# Patient Record
Sex: MALE | NOT HISPANIC OR LATINO | URBAN - METROPOLITAN AREA
[De-identification: names, ages, dates, MRNs, and addresses within clinical notes are randomized per-mention and may not be internally consistent; named-entity substitution may affect disease eponyms.]

---

## 2023-03-10 DIAGNOSIS — E03.9 HYPOTHYROIDISM, UNSPECIFIED: ICD-10-CM

## 2023-03-13 NOTE — TELEPHONE ENCOUNTER
Patient is due for apt prior to prescription approval.    Please contact the patient and assist with scheduling.    Once scheduled please route back to [facility] so we can review and forward to DO

## 2023-04-07 ENCOUNTER — OFFICE VISIT (OUTPATIENT)
Dept: PRIMARY CARE | Facility: CLINIC | Age: 23
End: 2023-04-07
Payer: COMMERCIAL

## 2023-04-07 ENCOUNTER — LAB (OUTPATIENT)
Dept: LAB | Facility: LAB | Age: 23
End: 2023-04-07
Payer: COMMERCIAL

## 2023-04-07 VITALS
WEIGHT: 130 LBS | RESPIRATION RATE: 20 BRPM | BODY MASS INDEX: 18.61 KG/M2 | HEIGHT: 70 IN | OXYGEN SATURATION: 98 % | SYSTOLIC BLOOD PRESSURE: 109 MMHG | HEART RATE: 70 BPM | TEMPERATURE: 98.2 F | DIASTOLIC BLOOD PRESSURE: 67 MMHG

## 2023-04-07 DIAGNOSIS — E03.9 HYPOTHYROIDISM, UNSPECIFIED TYPE: ICD-10-CM

## 2023-04-07 DIAGNOSIS — E53.8 VITAMIN B12 DEFICIENCY: ICD-10-CM

## 2023-04-07 DIAGNOSIS — E55.9 VITAMIN D DEFICIENCY: ICD-10-CM

## 2023-04-07 DIAGNOSIS — K21.9 GASTROESOPHAGEAL REFLUX DISEASE, UNSPECIFIED WHETHER ESOPHAGITIS PRESENT: Primary | ICD-10-CM

## 2023-04-07 PROBLEM — R10.13 ABDOMINAL PAIN, EPIGASTRIC: Status: ACTIVE | Noted: 2023-04-07

## 2023-04-07 PROBLEM — J06.9 ACUTE URI: Status: ACTIVE | Noted: 2023-04-07

## 2023-04-07 PROBLEM — B34.9 VIRAL ILLNESS: Status: ACTIVE | Noted: 2023-04-07

## 2023-04-07 PROBLEM — J02.9 ACUTE PHARYNGITIS: Status: ACTIVE | Noted: 2023-04-07

## 2023-04-07 PROBLEM — J02.9 SORE THROAT: Status: ACTIVE | Noted: 2023-04-07

## 2023-04-07 PROBLEM — R00.2 HEART PALPITATIONS: Status: ACTIVE | Noted: 2023-04-07

## 2023-04-07 PROBLEM — R63.4 WEIGHT LOSS: Status: ACTIVE | Noted: 2023-04-07

## 2023-04-07 LAB — THYROTROPIN (MIU/L) IN SER/PLAS BY DETECTION LIMIT <= 0.05 MIU/L: 1.8 MIU/L (ref 0.44–3.98)

## 2023-04-07 PROCEDURE — 84443 ASSAY THYROID STIM HORMONE: CPT

## 2023-04-07 PROCEDURE — 99213 OFFICE O/P EST LOW 20 MIN: CPT | Performed by: FAMILY MEDICINE

## 2023-04-07 PROCEDURE — 82306 VITAMIN D 25 HYDROXY: CPT

## 2023-04-07 PROCEDURE — 82607 VITAMIN B-12: CPT

## 2023-04-07 PROCEDURE — 36415 COLL VENOUS BLD VENIPUNCTURE: CPT

## 2023-04-07 RX ORDER — OMEPRAZOLE 40 MG/1
1 CAPSULE, DELAYED RELEASE ORAL DAILY
COMMUNITY
Start: 2020-06-16 | End: 2023-04-07 | Stop reason: SDUPTHER

## 2023-04-07 RX ORDER — OMEPRAZOLE 40 MG/1
40 CAPSULE, DELAYED RELEASE ORAL DAILY
Qty: 90 CAPSULE | Refills: 1 | Status: SHIPPED | OUTPATIENT
Start: 2023-04-07 | End: 2023-10-04

## 2023-04-07 RX ORDER — LEVOTHYROXINE SODIUM 25 UG/1
25 TABLET ORAL DAILY
COMMUNITY
End: 2023-04-07 | Stop reason: SDUPTHER

## 2023-04-07 RX ORDER — LEVOTHYROXINE SODIUM 25 UG/1
25 TABLET ORAL DAILY
Qty: 90 TABLET | Refills: 1 | Status: SHIPPED | OUTPATIENT
Start: 2023-04-07 | End: 2023-10-04

## 2023-04-07 NOTE — PROGRESS NOTES
Subjective   Patient ID: Cy Rosario is a 22 y.o. male who presents for Med Refill (No complaints).    HPI   Patient comes in today for checkup and refill of his medications.  He is currently without complaints.  He did run out of his omeprazole medication and did have some heartburn.  He has been taking the thyroid medicine as directed.        7/14/2022  Patient comes in today for annual physical exam. 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 He is taking his medicines without any side effects and admits that he is not always consistent in taking them.      3/30/2021  Patient presents today for checkup and refill of meds. He currently is without complaints other than occasional chest pains. He states he is no longer having the heart palpitations. He states that he is taking his medicines as directed and is not having any side effects from them.    12/15/2020  Patient comes in today for a physical exam. He complains of having heart palpitations, acid reflux and weight loss. Last year he was 125 and this year he is 118 pounds. He denies coffee or caffeine use but does drink iced tea.    6/16/2020  Patient comes in today complaining of nausea and difficulty eating in the last 2 weeks. He states he has lost 5 pounds. He just completed his senior year of high school despite the coronavirus pandemic. He is hoping to go to [employer] here in the fall. He denies any new stress in his life. He describes discomfort in the midepigastric region.    11/27/19  Patient comes in today with his mother as a new patient to the practice. He is a 19-year-old senior in good health. He has been following with a pediatrician until today and now that he is getting older his family would like to switch him over to family practice. His mother and father both come here.    As noted patient is in his senior year of high school. He is in the process of looking for a part-time job that he hopes to continue when he starts  "college next year. He is planning on going to [employer] here locally and majoring in computer science. He has a girlfriend that he has been dating for 2 years. He denies cigarettes, alcohol, vaping or other drugs. He is on no medications currently and enjoys playing all sorts of video games in his spare time.     Review of Systems   All other systems reviewed and are negative.      Objective   /67   Pulse 70   Temp 36.8 °C (98.2 °F)   Resp 20   Ht 1.778 m (5' 10\")   Wt 59 kg (130 lb)   SpO2 98%   BMI 18.65 kg/m²     Physical Exam  Vitals reviewed.   Constitutional:       Appearance: He is well-developed.   HENT:      Head: Normocephalic and atraumatic.      Right Ear: Tympanic membrane, ear canal and external ear normal.      Left Ear: Tympanic membrane, ear canal and external ear normal.      Nose: Nose normal.      Mouth/Throat:      Mouth: Mucous membranes are moist.      Pharynx: Oropharynx is clear.   Eyes:      Extraocular Movements: Extraocular movements intact.      Conjunctiva/sclera: Conjunctivae normal.      Pupils: Pupils are equal, round, and reactive to light.   Cardiovascular:      Rate and Rhythm: Normal rate and regular rhythm.      Heart sounds: Normal heart sounds. No murmur heard.  Pulmonary:      Effort: Pulmonary effort is normal.      Breath sounds: Normal breath sounds. No wheezing.   Abdominal:      General: Abdomen is flat. Bowel sounds are normal.      Palpations: Abdomen is soft.   Musculoskeletal:         General: Normal range of motion.   Skin:     General: Skin is warm and dry.      Comments: Multiple tattoos all over his body   Neurological:      General: No focal deficit present.      Mental Status: He is alert and oriented to person, place, and time. Mental status is at baseline.   Psychiatric:         Mood and Affect: Mood normal.         Behavior: Behavior normal.         Thought Content: Thought content normal.         Judgment: Judgment normal.         Assessment/Plan "   Problem List Items Addressed This Visit       GERD (gastroesophageal reflux disease) - Primary    Relevant Medications    omeprazole (PriLOSEC) 40 mg DR capsule    Hypothyroid    Relevant Medications    levothyroxine (Synthroid, Levoxyl) 25 mcg tablet    Other Relevant Orders    TSH with reflex to Free T4 if abnormal    Vitamin B12 deficiency    Relevant Orders    Vitamin B12    Vitamin D deficiency    Relevant Orders    Vitamin D, Total

## 2023-04-08 LAB
CALCIDIOL (25 OH VITAMIN D3) (NG/ML) IN SER/PLAS: 11 NG/ML
COBALAMIN (VITAMIN B12) (PG/ML) IN SER/PLAS: 554 PG/ML (ref 211–911)

## 2023-04-11 ENCOUNTER — TELEPHONE (OUTPATIENT)
Dept: PRIMARY CARE | Facility: CLINIC | Age: 23
End: 2023-04-11
Payer: COMMERCIAL

## 2023-04-11 NOTE — TELEPHONE ENCOUNTER
----- Message from Cesar Turner DO sent at 4/10/2023  8:02 AM EDT -----  Regarding: Labs  Please notify patient of extremely low vitamin D of 11.  It should be between 30 and 100 the closer to 50 the better. It is important vitamin for your heart, lungs, immune system and bones among other things in your body. Would recommend high potency vitamin D 50,000 units 4 times a week for the next 12 weeks and recheck in August 2023. Prescription sent to pharmacy.    The rest of the labs are good.  Continue current thyroid medication and recheck in 1 year.    ----- Message -----  From: Lab, Background User  Sent: 4/7/2023   7:01 PM EDT  To: Cesar Turner DO

## 2023-04-28 RX ORDER — LEVOTHYROXINE SODIUM 25 UG/1
TABLET ORAL
Qty: 90 TABLET | Refills: 1 | Status: SHIPPED | OUTPATIENT
Start: 2023-04-28

## 2025-03-04 ENCOUNTER — APPOINTMENT (OUTPATIENT)
Dept: PRIMARY CARE | Facility: CLINIC | Age: 25
End: 2025-03-04
Payer: COMMERCIAL

## 2025-03-04 VITALS
DIASTOLIC BLOOD PRESSURE: 77 MMHG | WEIGHT: 116 LBS | BODY MASS INDEX: 16.64 KG/M2 | OXYGEN SATURATION: 98 % | SYSTOLIC BLOOD PRESSURE: 122 MMHG | TEMPERATURE: 99 F | RESPIRATION RATE: 16 BRPM | HEART RATE: 90 BPM

## 2025-03-04 DIAGNOSIS — R63.4 WEIGHT LOSS: ICD-10-CM

## 2025-03-04 DIAGNOSIS — R53.83 FATIGUE, UNSPECIFIED TYPE: Primary | ICD-10-CM

## 2025-03-04 DIAGNOSIS — R41.89 BRAIN FOG: ICD-10-CM

## 2025-03-04 DIAGNOSIS — R63.6 UNDERWEIGHT (BMI < 18.5): ICD-10-CM

## 2025-03-04 DIAGNOSIS — E53.8 VITAMIN B12 DEFICIENCY: ICD-10-CM

## 2025-03-04 DIAGNOSIS — R00.2 HEART PALPITATIONS: ICD-10-CM

## 2025-03-04 DIAGNOSIS — E03.9 HYPOTHYROIDISM, UNSPECIFIED TYPE: ICD-10-CM

## 2025-03-04 DIAGNOSIS — E55.9 VITAMIN D DEFICIENCY: ICD-10-CM

## 2025-03-04 PROCEDURE — 99214 OFFICE O/P EST MOD 30 MIN: CPT | Performed by: FAMILY MEDICINE

## 2025-03-04 ASSESSMENT — PATIENT HEALTH QUESTIONNAIRE - PHQ9
2. FEELING DOWN, DEPRESSED OR HOPELESS: NOT AT ALL
1. LITTLE INTEREST OR PLEASURE IN DOING THINGS: NOT AT ALL
SUM OF ALL RESPONSES TO PHQ9 QUESTIONS 1 AND 2: 0

## 2025-03-04 ASSESSMENT — ENCOUNTER SYMPTOMS
DEPRESSION: 0
OCCASIONAL FEELINGS OF UNSTEADINESS: 0
LOSS OF SENSATION IN FEET: 0

## 2025-03-04 NOTE — PROGRESS NOTES
Subjective   Patient ID: Cy Rosario is a 24 y.o. male who presents for Follow-up (Med fu. Has not had levothyroxine in a long time, he was young and thought he did not need it. For the last 6 months he has been having increased heart rate and fatigue and brain fog. ).    HPI  Patient comes in today for reevaluation of his thyroid.  He has not been here since April 2023.  He has been having problems with fatigue, brain fog, heart palpitations, dizziness, decreased energy and anxiety for the last 6 months.  We had diagnosed him in the past with hypothyroidism and had placed him on medication but he has not continued to take it.  He is ready to get back on it now if that is what he needs.    Review of Systems   All other systems reviewed and are negative.      Objective   Vitals:  /77   Pulse 90   Temp 37.2 °C (99 °F)   Resp 16   Wt 52.6 kg (116 lb)   SpO2 98%   BMI 16.64 kg/m²     Physical Exam  Vitals reviewed.   Constitutional:       Appearance: He is underweight.   HENT:      Head: Normocephalic and atraumatic.      Right Ear: Tympanic membrane, ear canal and external ear normal.      Left Ear: Tympanic membrane, ear canal and external ear normal.      Nose: Nose normal.      Mouth/Throat:      Mouth: Mucous membranes are moist.      Pharynx: Oropharynx is clear.   Eyes:      Extraocular Movements: Extraocular movements intact.      Conjunctiva/sclera: Conjunctivae normal.      Pupils: Pupils are equal, round, and reactive to light.   Cardiovascular:      Rate and Rhythm: Normal rate and regular rhythm.      Heart sounds: Normal heart sounds. No murmur heard.  Pulmonary:      Effort: Pulmonary effort is normal.      Breath sounds: Normal breath sounds. No wheezing.   Abdominal:      General: Abdomen is flat. Bowel sounds are normal.      Palpations: Abdomen is soft.   Musculoskeletal:         General: Normal range of motion.   Skin:     General: Skin is warm and dry.      Comments: Multiple tattoos  all over his body   Neurological:      General: No focal deficit present.      Mental Status: He is alert and oriented to person, place, and time. Mental status is at baseline.   Psychiatric:         Mood and Affect: Mood normal.         Behavior: Behavior normal. Behavior is cooperative.         Thought Content: Thought content normal.         Judgment: Judgment normal.       Assessment/Plan   Problem List Items Addressed This Visit       Heart palpitations    Hypothyroid    Vitamin B12 deficiency    Relevant Orders    CBC    Vitamin B12    Vitamin D deficiency    Relevant Orders    Vitamin D 25-Hydroxy,Total (for eval of Vitamin D levels)    Weight loss    Relevant Orders    Comprehensive Metabolic Panel    TSH with reflex to Free T4 if abnormal    Brain fog    Fatigue - Primary   Will contact patient with lab results when available.  Medication list reconciled.  Thank you for visiting today!      Professional services: Some of this note was completed using Dragon voice technology and sometimes the software misinterprets words. This may include unintended errors with respect to translation of words, typographical errors or grammar errors which may not have been identified prior to finalization of the chart note. Please take this into account when reading the note. Thank you.       Cesar Turner DO 03/04/25 4:56 PM

## 2025-03-05 ENCOUNTER — TELEPHONE (OUTPATIENT)
Dept: PRIMARY CARE | Facility: CLINIC | Age: 25
End: 2025-03-05
Payer: COMMERCIAL

## 2025-03-05 LAB
25(OH)D3+25(OH)D2 SERPL-MCNC: 22 NG/ML (ref 30–100)
ALBUMIN SERPL-MCNC: 4.9 G/DL (ref 3.6–5.1)
ALP SERPL-CCNC: 44 U/L (ref 36–130)
ALT SERPL-CCNC: 23 U/L (ref 9–46)
ANION GAP SERPL CALCULATED.4IONS-SCNC: 8 MMOL/L (CALC) (ref 7–17)
AST SERPL-CCNC: 22 U/L (ref 10–40)
BILIRUB SERPL-MCNC: 0.5 MG/DL (ref 0.2–1.2)
BUN SERPL-MCNC: 15 MG/DL (ref 7–25)
CALCIUM SERPL-MCNC: 9.7 MG/DL (ref 8.6–10.3)
CHLORIDE SERPL-SCNC: 104 MMOL/L (ref 98–110)
CO2 SERPL-SCNC: 26 MMOL/L (ref 20–32)
CREAT SERPL-MCNC: 0.8 MG/DL (ref 0.6–1.24)
EGFRCR SERPLBLD CKD-EPI 2021: 128 ML/MIN/1.73M2
ERYTHROCYTE [DISTWIDTH] IN BLOOD BY AUTOMATED COUNT: 13.4 % (ref 11–15)
GLUCOSE SERPL-MCNC: 93 MG/DL (ref 65–139)
HCT VFR BLD AUTO: 39.6 % (ref 38.5–50)
HGB BLD-MCNC: 13.1 G/DL (ref 13.2–17.1)
MCH RBC QN AUTO: 26.7 PG (ref 27–33)
MCHC RBC AUTO-ENTMCNC: 33.1 G/DL (ref 32–36)
MCV RBC AUTO: 80.8 FL (ref 80–100)
PLATELET # BLD AUTO: 392 THOUSAND/UL (ref 140–400)
PMV BLD REES-ECKER: 9.7 FL (ref 7.5–12.5)
POTASSIUM SERPL-SCNC: 5.1 MMOL/L (ref 3.5–5.3)
PROT SERPL-MCNC: 7.2 G/DL (ref 6.1–8.1)
RBC # BLD AUTO: 4.9 MILLION/UL (ref 4.2–5.8)
SODIUM SERPL-SCNC: 138 MMOL/L (ref 135–146)
TSH SERPL-ACNC: 3.12 MIU/L (ref 0.4–4.5)
VIT B12 SERPL-MCNC: 801 PG/ML (ref 200–1100)
WBC # BLD AUTO: 6 THOUSAND/UL (ref 3.8–10.8)

## 2025-03-05 NOTE — RESULT ENCOUNTER NOTE
Please notify patient of mild anemia.  Would recommend watching for signs of blood in the urine and stool or excessive nosebleeds.  Would recommend Prilosec 40 mg daily and return to clinic in 1 month to recheck weight and blood count.  Prescription can be sent to pharmacy of his choice.      Vitamin D level was low at 22.  It should be between 30 and 100 the closer to 50 the better. It is an important vitamin for your heart, lungs, immune system and bones among other things in your body. Would recommend over the counter vitamin D3 2000 units daily to get it into and keep it in the normal range and recheck in July 2025.     Vitamin B-12 level was good.      The rest of his labs were good including his thyroid level.  No thyroid treatment for now.

## 2025-03-05 NOTE — TELEPHONE ENCOUNTER
----- Message from Cesar Turner sent at 3/5/2025  1:21 PM EST -----  Please notify patient of mild anemia.  Would recommend watching for signs of blood in the urine and stool or excessive nosebleeds.  Would recommend Prilosec 40 mg daily and return to clinic in 1 month to recheck weight and blood count.  Prescription can be sent to pharmacy of his choice.      Vitamin D level was low at 22.  It should be between 30 and 100 the closer to 50 the better. It is an important vitamin for your heart, lungs, immune system and bones among other things in your body. Would recommend over the counter vitamin D3 2000 units daily to get it into and keep it in the normal range and recheck in July 2025.     Vitamin B-12 level was good.      The rest of his labs were good including his thyroid level.  No thyroid treatment for now.

## 2025-04-07 ENCOUNTER — APPOINTMENT (OUTPATIENT)
Dept: PRIMARY CARE | Facility: CLINIC | Age: 25
End: 2025-04-07
Payer: COMMERCIAL

## 2025-04-07 VITALS
HEART RATE: 100 BPM | RESPIRATION RATE: 16 BRPM | BODY MASS INDEX: 17.32 KG/M2 | TEMPERATURE: 99.7 F | HEIGHT: 70 IN | OXYGEN SATURATION: 98 % | DIASTOLIC BLOOD PRESSURE: 66 MMHG | SYSTOLIC BLOOD PRESSURE: 113 MMHG | WEIGHT: 121 LBS

## 2025-04-07 DIAGNOSIS — R53.83 FATIGUE, UNSPECIFIED TYPE: ICD-10-CM

## 2025-04-07 DIAGNOSIS — D50.9 IRON DEFICIENCY ANEMIA, UNSPECIFIED IRON DEFICIENCY ANEMIA TYPE: ICD-10-CM

## 2025-04-07 DIAGNOSIS — R63.6 UNDERWEIGHT (BMI < 18.5): Primary | ICD-10-CM

## 2025-04-07 DIAGNOSIS — K21.9 GASTROESOPHAGEAL REFLUX DISEASE, UNSPECIFIED WHETHER ESOPHAGITIS PRESENT: ICD-10-CM

## 2025-04-07 DIAGNOSIS — E55.9 VITAMIN D DEFICIENCY: ICD-10-CM

## 2025-04-07 DIAGNOSIS — R00.2 HEART PALPITATIONS: ICD-10-CM

## 2025-04-07 DIAGNOSIS — E53.8 VITAMIN B12 DEFICIENCY: ICD-10-CM

## 2025-04-07 PROCEDURE — 99213 OFFICE O/P EST LOW 20 MIN: CPT | Performed by: FAMILY MEDICINE

## 2025-04-07 RX ORDER — OMEPRAZOLE 20 MG/1
20 CAPSULE, DELAYED RELEASE ORAL DAILY
Qty: 90 CAPSULE | Refills: 1 | Status: SHIPPED | OUTPATIENT
Start: 2025-04-07 | End: 2025-10-04

## 2025-04-07 NOTE — PROGRESS NOTES
"Subjective   Patient ID: Cy Rosario is a 24 y.o. male who presents for Follow-up (Making sure he is eating more daily at least 3,000 calories. He is taking the Prilosec and vit d and he feels like it is helping but still fatigued some days. ).    HPI  Patient presents today for 1-month checkup and refill of meds.  He states that he is taking his medicines as directed and is not having any side effects from them. He currently is without complaints.    He states the Prilosec is helping and he is eating more calories each day 2500 to 3000 shayla on average.  He has gained 5 pounds since a month ago and in general he is feeling better.  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  He states he also notices occasional palpitations.    Review of Systems   All other systems reviewed and are negative.      Objective   Vitals:  /66   Pulse 100   Temp 37.6 °C (99.7 °F)   Resp 16   Ht 1.778 m (5' 10\")   Wt 54.9 kg (121 lb)   SpO2 98%   BMI 17.36 kg/m²     Physical Exam  Vitals reviewed.   Constitutional:       Appearance: He is underweight.   HENT:      Head: Normocephalic and atraumatic.      Right Ear: Tympanic membrane, ear canal and external ear normal.      Left Ear: Tympanic membrane, ear canal and external ear normal.      Nose: Nose normal.      Mouth/Throat:      Mouth: Mucous membranes are moist.      Pharynx: Oropharynx is clear.   Eyes:      Extraocular Movements: Extraocular movements intact.      Conjunctiva/sclera: Conjunctivae normal.      Pupils: Pupils are equal, round, and reactive to light.   Cardiovascular:      Rate and Rhythm: Normal rate and regular rhythm.      Heart sounds: Normal heart sounds. No murmur heard.  Pulmonary:      Effort: Pulmonary effort is normal.      Breath sounds: Normal breath sounds. No wheezing.   Abdominal:      General: " "Abdomen is flat. Bowel sounds are normal.      Palpations: Abdomen is soft.   Musculoskeletal:         General: Normal range of motion.   Skin:     General: Skin is warm and dry.      Comments: Multiple tattoos all over his body   Neurological:      General: No focal deficit present.      Mental Status: He is alert and oriented to person, place, and time. Mental status is at baseline.   Psychiatric:         Mood and Affect: Mood normal.         Behavior: Behavior normal. Behavior is cooperative.         Thought Content: Thought content normal.         Judgment: Judgment normal.         CBC -  Recent Labs     03/04/25  1535 12/15/20  1010   WBC 6.0 7.2   HGB 13.1* 15.2   HCT 39.6 48.1    324   MCV 80.8 83       CMP -  Recent Labs     03/04/25  1535 07/14/22  1141 12/15/20  1010    140 139   K 5.1 4.1 4.3    103 100   CO2 26 28 28   ANIONGAP 8 13 15   BUN 15 9 14   CREATININE 0.80 0.70 0.90   EGFR 128  --   --      Recent Labs     03/04/25  1535 07/14/22  1141 12/15/20  1010   ALBUMIN 4.9 4.7 5.1*   ALKPHOS 44 67 61   ALT 23 12 16   AST 22 17 17   BILITOT 0.5 0.4 0.7       LIPID PANEL -   Recent Labs     12/15/20  1010   CHOL 146   LDLF 74   HDL 62.0   TRIG 51       No results for input(s): \"BNP\", \"HGBA1C\" in the last 25463 hours.    Lab Results   Component Value Date    RNNBDQVH76 801 03/04/2025       Lab Results   Component Value Date    VITD25 22 (L) 03/04/2025        Assessment/Plan   Problem List Items Addressed This Visit       GERD (gastroesophageal reflux disease)    Relevant Medications    omeprazole (PriLOSEC) 20 mg DR capsule    Other Relevant Orders    Follow Up In [facility] - PCP - Established    Heart palpitations    Vitamin B12 deficiency    Vitamin D deficiency    Fatigue    Underweight (BMI < 18.5) - Primary    Iron deficiency anemia   Start iron pills once a day  Continue current meds as directed.  Follow up in 3 months for recheck if all remains stable, sooner if " problems arise.  Medication list reconciled.  Thank you for visiting today!      Professional services: Some of this note was completed using Dragon voice technology and sometimes the software misinterprets words. This may include unintended errors with respect to translation of words, typographical errors or grammar errors which may not have been identified prior to finalization of the chart note. Please take this into account when reading the note. Thank you.         Cesar Turner DO 04/07/25 2:20 PM

## 2025-07-07 ENCOUNTER — APPOINTMENT (OUTPATIENT)
Dept: PRIMARY CARE | Facility: CLINIC | Age: 25
End: 2025-07-07
Payer: COMMERCIAL